# Patient Record
Sex: FEMALE | Race: OTHER | NOT HISPANIC OR LATINO
[De-identification: names, ages, dates, MRNs, and addresses within clinical notes are randomized per-mention and may not be internally consistent; named-entity substitution may affect disease eponyms.]

---

## 2019-07-26 PROBLEM — Z00.00 ENCOUNTER FOR PREVENTIVE HEALTH EXAMINATION: Status: ACTIVE | Noted: 2019-07-26

## 2019-07-30 ENCOUNTER — APPOINTMENT (OUTPATIENT)
Dept: BURN CARE | Facility: CLINIC | Age: 39
End: 2019-07-30
Payer: MEDICAID

## 2019-07-30 DIAGNOSIS — T25.221S: ICD-10-CM

## 2019-07-30 DIAGNOSIS — Z78.9 OTHER SPECIFIED HEALTH STATUS: ICD-10-CM

## 2019-07-30 PROCEDURE — 99204 OFFICE O/P NEW MOD 45 MIN: CPT | Mod: 25

## 2019-07-30 PROCEDURE — 16020 DRESS/DEBRID P-THICK BURN S: CPT

## 2019-07-30 NOTE — REASON FOR VISIT
[Were you seen in the Emergency Room?] : seen in the emergency room [Initial] : initial visit [Family Member] : family member [Were you admitted to the burn center at [facility]?] : not admitted to the burn center at [facility]

## 2019-07-30 NOTE — PHYSICAL EXAM
[Healing] : healing [Abnormal] : abnormal [3] : 3 out of 10 [Medium] : medium [] : no [FreeTextEntry1] : Using [employer] shoes to help walk.  [de-identified] : Right Foot -  1st and 5th digit with dry, healed hyperemic skin  2-4th digit open wound with some yellow exudate and crusting.\par Dorsum of foot mostly dry with pink/purple tissue/ ~1% TBSA\par Left Foot - Completely dried and healed with hyperpigmented skin. <1% TBSA
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
## 2019-08-06 ENCOUNTER — APPOINTMENT (OUTPATIENT)
Dept: BURN CARE | Facility: CLINIC | Age: 39
End: 2019-08-06